# Patient Record
Sex: MALE
[De-identification: names, ages, dates, MRNs, and addresses within clinical notes are randomized per-mention and may not be internally consistent; named-entity substitution may affect disease eponyms.]

---

## 2018-07-10 PROBLEM — Z00.00 ENCOUNTER FOR PREVENTIVE HEALTH EXAMINATION: Status: ACTIVE | Noted: 2018-07-10

## 2018-08-09 ENCOUNTER — APPOINTMENT (OUTPATIENT)
Dept: UROLOGY | Facility: CLINIC | Age: 58
End: 2018-08-09
Payer: COMMERCIAL

## 2018-08-09 VITALS
HEIGHT: 71 IN | SYSTOLIC BLOOD PRESSURE: 135 MMHG | HEART RATE: 88 BPM | BODY MASS INDEX: 28.7 KG/M2 | DIASTOLIC BLOOD PRESSURE: 87 MMHG | WEIGHT: 205 LBS

## 2018-08-09 DIAGNOSIS — R35.1 NOCTURIA: ICD-10-CM

## 2018-08-09 DIAGNOSIS — F52.21 MALE ERECTILE DISORDER: ICD-10-CM

## 2018-08-09 DIAGNOSIS — Z78.9 OTHER SPECIFIED HEALTH STATUS: ICD-10-CM

## 2018-08-09 DIAGNOSIS — N46.9 MALE INFERTILITY, UNSPECIFIED: ICD-10-CM

## 2018-08-09 DIAGNOSIS — C61 MALIGNANT NEOPLASM OF PROSTATE: ICD-10-CM

## 2018-08-09 DIAGNOSIS — Z82.49 FAMILY HISTORY OF ISCHEMIC HEART DISEASE AND OTHER DISEASES OF THE CIRCULATORY SYSTEM: ICD-10-CM

## 2018-08-09 DIAGNOSIS — R39.15 URGENCY OF URINATION: ICD-10-CM

## 2018-08-09 DIAGNOSIS — R39.198 OTHER DIFFICULTIES WITH MICTURITION: ICD-10-CM

## 2018-08-09 DIAGNOSIS — K40.20 BILATERAL INGUINAL HERNIA, W/OUT OBSTRUCTION OR GANGRENE, NOT SPECIFIED AS RECURRENT: ICD-10-CM

## 2018-08-09 DIAGNOSIS — R33.9 RETENTION OF URINE, UNSPECIFIED: ICD-10-CM

## 2018-08-09 PROCEDURE — 99203 OFFICE O/P NEW LOW 30 MIN: CPT

## 2018-08-09 RX ORDER — TAMSULOSIN HYDROCHLORIDE 0.4 MG/1
0.4 CAPSULE ORAL
Qty: 30 | Refills: 2 | Status: ACTIVE | COMMUNITY
Start: 2018-08-09 | End: 1900-01-01

## 2018-08-09 NOTE — ASSESSMENT
[FreeTextEntry1] : His exam is relatively benign. Six the testicles were acceptable in size and consistency. There is a little fullness to the court but I felt of as on both sides and the epididymis did not feel indurated. I did not do a PILAR. Of note is he has bilateral hernias left is larger than the right and I strongly advise in to see a general surgeon to discuss the needs for repair and if indicated the options thereof.\par \par He is going for experimental prostate cancer therapy. He’d like to check his sperm and if it’s good to preserve it and he has mild voiding dysfunction which we are going to hold off on because if he does go for this heat therapy of the prostate that will affect the dynamics thereof possibly better possibly worse. We will worry about avoiding when he comes back. If he’d like to start on some Flomax so that it will decrease the chance of having problems from the thermal therapy, and please note that’s decrease not prevent I’ve no objection to starting him on it.\par

## 2018-08-09 NOTE — LETTER HEADER
[FreeTextEntry3] : Alcon Purdy M.D.\par Director of Urology\par [facility]\par [address] [place] [address], [address]\[place] [address]

## 2018-08-09 NOTE — ADDENDUM
[FreeTextEntry1] : Note he was told to hold on the Flomax until he gives the sperm sample has Flomax can decrease the amount of sperm obtained with ejaculation

## 2018-08-09 NOTE — HISTORY OF PRESENT ILLNESS
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 He fell in retrospect that I was correct. He is using Viagra now as needed and says it works well enough. He’s here today as he has prostate cancer that over the last five years has been in watchful waiting and has progressed from a Yonny’s grade 4 or five up to a seven with MRI showing an increase in dimensions of known cancer he does. He does not want a radical prostate and instead is going on an experimental protocol where he will have interstitial laser thermal therapy of the areas that an MRI are positive. He understands that this may not work, it is unproven therapy, but he’d like to avoid the radical prostatectomy as he is concerned re-the side effects of voiding dysfunction, incontinence and impotence. He is here today as he was told that this therapy may have some effect on his ability to ejaculate sperm. He has a 40-year-old son who was his only child. He has a 40-year-old lady friend who at this point assuming he does well would consider having his next child. He like to know what his sperm potential is like and if it’s acceptable he wants to free sperm to use at a later time. He is aware that such testing and sperm preservation is unlikely to be covered by his insurance but he still wants to proceed. He has occasional voiding dysfunction about 50 to 60% of the time he finds the stream is not as fast as it used to be. When he finishes urinating he feels like their stuff left behind and when he has to go he has to go. On a scale of zero – six he gives his quality of life for two or three. At this point we are not going to look into that because if he does to thermal ablation a part of the prostate that may help the prostate put less tension on the urethra and if so is voiding may get better. He understands that initially there will be some swelling from the thermal therapy and is 40 may get worse the doctor that is doing the therapy will deal with that then. [Hematuria - Gross] : no gross hematuria [Hematuria - Microscopic] : no microscopic hematuria [Bladder Spasm] : no bladder spasm

## 2018-08-09 NOTE — PHYSICAL EXAM
[General Appearance - Well Developed] : well developed [General Appearance - Well Nourished] : well nourished [Normal Appearance] : normal appearance [Well Groomed] : well groomed [General Appearance - In No Acute Distress] : no acute distress [Heart Rate And Rhythm] : Heart rate and rhythm were normal [Edema] : no peripheral edema [] : no respiratory distress [Respiration, Rhythm And Depth] : normal respiratory rhythm and effort [Exaggerated Use Of Accessory Muscles For Inspiration] : no accessory muscle use [Auscultation Breath Sounds / Voice Sounds] : lungs were clear to auscultation bilaterally [Abdomen Soft] : soft [Abdomen Tenderness] : non-tender [Costovertebral Angle Tenderness] : no ~M costovertebral angle tenderness [Urethral Meatus] : meatus normal [Penis Abnormality] : normal circumcised penis [Epididymis] : the epididymides were normal [Testes Tenderness] : no tenderness of the testes [Testes Mass (___cm)] : there were no testicular masses [Normal Station and Gait] : the gait and station were normal for the patient's age [No Focal Deficits] : no focal deficits [Oriented To Time, Place, And Person] : oriented to person, place, and time [Affect] : the affect was normal [Mood] : the mood was normal [Not Anxious] : not anxious [FreeTextEntry1] : minh not done